# Patient Record
Sex: MALE | Race: WHITE
[De-identification: names, ages, dates, MRNs, and addresses within clinical notes are randomized per-mention and may not be internally consistent; named-entity substitution may affect disease eponyms.]

---

## 2019-04-30 ENCOUNTER — HOSPITAL ENCOUNTER (OUTPATIENT)
Dept: HOSPITAL 11 - JP.SDS | Age: 34
Discharge: HOME | End: 2019-04-30
Attending: SURGERY
Payer: COMMERCIAL

## 2019-04-30 DIAGNOSIS — E66.01: ICD-10-CM

## 2019-04-30 DIAGNOSIS — Z88.0: ICD-10-CM

## 2019-04-30 DIAGNOSIS — Z88.2: ICD-10-CM

## 2019-04-30 DIAGNOSIS — K40.90: ICD-10-CM

## 2019-04-30 DIAGNOSIS — D17.6: Primary | ICD-10-CM

## 2019-04-30 PROCEDURE — C1781 MESH (IMPLANTABLE): HCPCS

## 2019-05-01 NOTE — OR
DATE OF PROCEDURE:  04/30/2019

 

PROCEDURE:  Transversus abdominis plane block, bilaterally.

 

COMPLICATION:  None.

 

SURGEON:  Pj Johnson MD

 

ASSISTANT:  None.

 

RISKS:  Risks, benefits, alternatives, limitations including, but not limited to infection,

bleeding, injury to abdominal structures were all explained to the patient today, who wished

to proceed.

 

PROCEDURE IN DETAIL:  The patient was placed in supine position.  The right transverse plane

was identified first.  The patient had very poor fascial planes due to his body habitus.

Nonetheless, correct plane was identified, 80% of solution was injected under direct

visualization.

 

The other side was addressed in same manner, same fashion, same technique, and same sequence

using the same ultrasound except for different needle and syringe.  The same amount of

volume was injected.  Dressings were applied.  The patient tolerated the procedure well.

 

 

 

 

Pj Johnson MD

DD:  04/30/2019 16:36:50

DT:  04/30/2019 23:29:19

Job #:  [id number]

## 2019-05-01 NOTE — OR
DATE OF PROCEDURE:  04/30/2019

 

PROCEDURE:  Left inguinal hernia repair, open.

 

COMPLICATIONS:  None.

 

SURGEON:  Pj Johnson MD

 

ASSISTANT:  None.

 

ANESTHESIA:  MAC/local.

 

RISKS:  Risks, benefits, alternatives, and limitations including, but not limited to

infection, bleeding, and injury to bowel or bladder along with testicular artery, vas

deferens injuries were all explained to the patient, who wished to proceed.

 

PROCEDURE IN DETAIL:  The patient was placed in supine position. The left groin was prepped

and draped.  The patient had a rather large body habitus.  A curvilinear type incision was

made over the ring structures to the lateral side of the pubic symphysis.  This was carried

down through large amount of fat up to the external oblique aponeurosis, which was then

opened with a back of a 15 blade.  Metzenbaum scissors were used to further open this.  The

indirect turning hernia was identified and would be dissected freely.  Cord structures were

identified, and the Penrose drain was able to be removed around this without difficulty.

Sac and large lipoma structures were mobilized.  First, structures were  from this

freely along during the blunt dissection.  As this continued, the cord structures were fully

mobilized.  Careful attention was made not to damage the testicular artery, vein, and

associated vas deferens.

 

An extra-large plug and patch was then able to be placed in without difficulty.  This was

placed and secured with a combination of tacking and sewing.  This was also proximally to

the pubic symphysis without difficulty.  The tail was wrapped around the cord structures.

This was also secured in place.

 

External oblique aponeurosis was closed with 3-0 Vicryl in a running fashion.  All layers

were thoroughly irrigated.  Subcutaneous tissues were approximated.  The skin was closed

with 4-0 Vicryl.  The patient tolerated the procedure well.

 

 

 

 

Pj Johnson MD

DD:  04/30/2019 16:35:21

DT:  04/30/2019 23:17:56

Job #:  [id number]